# Patient Record
Sex: FEMALE | Race: BLACK OR AFRICAN AMERICAN | NOT HISPANIC OR LATINO | URBAN - METROPOLITAN AREA
[De-identification: names, ages, dates, MRNs, and addresses within clinical notes are randomized per-mention and may not be internally consistent; named-entity substitution may affect disease eponyms.]

---

## 2017-03-11 ENCOUNTER — EMERGENCY (EMERGENCY)
Facility: HOSPITAL | Age: 37
LOS: 1 days | Discharge: HOME | End: 2017-03-11
Attending: EMERGENCY MEDICINE
Payer: MEDICAID

## 2017-03-11 VITALS
SYSTOLIC BLOOD PRESSURE: 158 MMHG | HEART RATE: 93 BPM | HEIGHT: 67 IN | WEIGHT: 145.06 LBS | RESPIRATION RATE: 18 BRPM | OXYGEN SATURATION: 100 % | DIASTOLIC BLOOD PRESSURE: 105 MMHG | TEMPERATURE: 98 F

## 2017-03-11 VITALS
HEART RATE: 64 BPM | TEMPERATURE: 98 F | SYSTOLIC BLOOD PRESSURE: 166 MMHG | OXYGEN SATURATION: 98 % | RESPIRATION RATE: 16 BRPM | DIASTOLIC BLOOD PRESSURE: 84 MMHG

## 2017-03-11 LAB
ALBUMIN SERPL ELPH-MCNC: 4.1 G/DL — SIGNIFICANT CHANGE UP (ref 3.5–5)
ALP SERPL-CCNC: 46 U/L — SIGNIFICANT CHANGE UP (ref 40–120)
ALT FLD-CCNC: 22 U/L DA — SIGNIFICANT CHANGE UP (ref 10–60)
ANION GAP SERPL CALC-SCNC: 6 MMOL/L — SIGNIFICANT CHANGE UP (ref 5–17)
APPEARANCE UR: ABNORMAL
AST SERPL-CCNC: 37 U/L — SIGNIFICANT CHANGE UP (ref 10–40)
BACTERIA # UR AUTO: ABNORMAL /HPF
BASOPHILS # BLD AUTO: 0.1 K/UL — SIGNIFICANT CHANGE UP (ref 0–0.2)
BASOPHILS NFR BLD AUTO: 1.3 % — SIGNIFICANT CHANGE UP (ref 0–2)
BILIRUB SERPL-MCNC: 0.3 MG/DL — SIGNIFICANT CHANGE UP (ref 0.2–1.2)
BILIRUB UR-MCNC: NEGATIVE — SIGNIFICANT CHANGE UP
BUN SERPL-MCNC: 18 MG/DL — SIGNIFICANT CHANGE UP (ref 7–18)
CALCIUM SERPL-MCNC: 7.9 MG/DL — LOW (ref 8.4–10.5)
CHLORIDE SERPL-SCNC: 110 MMOL/L — HIGH (ref 96–108)
CO2 SERPL-SCNC: 25 MMOL/L — SIGNIFICANT CHANGE UP (ref 22–31)
COLOR SPEC: YELLOW — SIGNIFICANT CHANGE UP
COMMENT - URINE: SIGNIFICANT CHANGE UP
CREAT SERPL-MCNC: 0.95 MG/DL — SIGNIFICANT CHANGE UP (ref 0.5–1.3)
DIFF PNL FLD: ABNORMAL
EOSINOPHIL # BLD AUTO: 0.2 K/UL — SIGNIFICANT CHANGE UP (ref 0–0.5)
EOSINOPHIL NFR BLD AUTO: 3.3 % — SIGNIFICANT CHANGE UP (ref 0–6)
EPI CELLS # UR: ABNORMAL (ref 0–10)
GLUCOSE SERPL-MCNC: 79 MG/DL — SIGNIFICANT CHANGE UP (ref 70–99)
GLUCOSE UR QL: NEGATIVE — SIGNIFICANT CHANGE UP
HCG SERPL-ACNC: <1 MIU/ML — SIGNIFICANT CHANGE UP
HCG UR QL: NEGATIVE — SIGNIFICANT CHANGE UP
HCT VFR BLD CALC: 40.8 % — SIGNIFICANT CHANGE UP (ref 34.5–45)
HGB BLD-MCNC: 12.4 G/DL — SIGNIFICANT CHANGE UP (ref 11.5–15.5)
KETONES UR-MCNC: ABNORMAL
LEUKOCYTE ESTERASE UR-ACNC: ABNORMAL
LIDOCAIN IGE QN: 60 U/L — LOW (ref 73–393)
LYMPHOCYTES # BLD AUTO: 1.8 K/UL — SIGNIFICANT CHANGE UP (ref 1–3.3)
LYMPHOCYTES # BLD AUTO: 38.8 % — SIGNIFICANT CHANGE UP (ref 13–44)
MAGNESIUM SERPL-MCNC: 2.1 MG/DL — SIGNIFICANT CHANGE UP (ref 1.8–2.4)
MCHC RBC-ENTMCNC: 27.7 PG — SIGNIFICANT CHANGE UP (ref 27–34)
MCHC RBC-ENTMCNC: 30.3 GM/DL — LOW (ref 32–36)
MCV RBC AUTO: 91.3 FL — SIGNIFICANT CHANGE UP (ref 80–100)
MONOCYTES # BLD AUTO: 0.3 K/UL — SIGNIFICANT CHANGE UP (ref 0–0.9)
MONOCYTES NFR BLD AUTO: 6.7 % — SIGNIFICANT CHANGE UP (ref 2–14)
NEUTROPHILS # BLD AUTO: 2.4 K/UL — SIGNIFICANT CHANGE UP (ref 1.8–7.4)
NEUTROPHILS NFR BLD AUTO: 50 % — SIGNIFICANT CHANGE UP (ref 43–77)
NITRITE UR-MCNC: NEGATIVE — SIGNIFICANT CHANGE UP
PH UR: 5 — SIGNIFICANT CHANGE UP (ref 4.8–8)
PLATELET # BLD AUTO: 208 K/UL — SIGNIFICANT CHANGE UP (ref 150–400)
POTASSIUM SERPL-MCNC: 4.5 MMOL/L — SIGNIFICANT CHANGE UP (ref 3.5–5.3)
POTASSIUM SERPL-SCNC: 4.5 MMOL/L — SIGNIFICANT CHANGE UP (ref 3.5–5.3)
PROT SERPL-MCNC: 7.6 G/DL — SIGNIFICANT CHANGE UP (ref 6–8.3)
PROT UR-MCNC: 30 MG/DL
RBC # BLD: 4.47 M/UL — SIGNIFICANT CHANGE UP (ref 3.8–5.2)
RBC # FLD: 14.2 % — SIGNIFICANT CHANGE UP (ref 10.3–14.5)
RBC CASTS # UR COMP ASSIST: SIGNIFICANT CHANGE UP /HPF (ref 0–2)
SODIUM SERPL-SCNC: 141 MMOL/L — SIGNIFICANT CHANGE UP (ref 135–145)
SP GR SPEC: 1.02 — SIGNIFICANT CHANGE UP (ref 1.01–1.02)
UROBILINOGEN FLD QL: 1
WBC # BLD: 4.8 K/UL — SIGNIFICANT CHANGE UP (ref 3.8–10.5)
WBC # FLD AUTO: 4.8 K/UL — SIGNIFICANT CHANGE UP (ref 3.8–10.5)
WBC UR QL: SIGNIFICANT CHANGE UP /HPF (ref 0–5)

## 2017-03-11 PROCEDURE — 80053 COMPREHEN METABOLIC PANEL: CPT

## 2017-03-11 PROCEDURE — 85027 COMPLETE CBC AUTOMATED: CPT

## 2017-03-11 PROCEDURE — 81001 URINALYSIS AUTO W/SCOPE: CPT

## 2017-03-11 PROCEDURE — 84702 CHORIONIC GONADOTROPIN TEST: CPT

## 2017-03-11 PROCEDURE — 83690 ASSAY OF LIPASE: CPT

## 2017-03-11 PROCEDURE — 99284 EMERGENCY DEPT VISIT MOD MDM: CPT | Mod: 25

## 2017-03-11 PROCEDURE — 87086 URINE CULTURE/COLONY COUNT: CPT

## 2017-03-11 PROCEDURE — 74177 CT ABD & PELVIS W/CONTRAST: CPT | Mod: 26

## 2017-03-11 PROCEDURE — 96374 THER/PROPH/DIAG INJ IV PUSH: CPT | Mod: XU

## 2017-03-11 PROCEDURE — 83735 ASSAY OF MAGNESIUM: CPT

## 2017-03-11 PROCEDURE — 81025 URINE PREGNANCY TEST: CPT

## 2017-03-11 PROCEDURE — 74177 CT ABD & PELVIS W/CONTRAST: CPT

## 2017-03-11 PROCEDURE — 99285 EMERGENCY DEPT VISIT HI MDM: CPT

## 2017-03-11 RX ORDER — MORPHINE SULFATE 50 MG/1
4 CAPSULE, EXTENDED RELEASE ORAL ONCE
Qty: 0 | Refills: 0 | Status: DISCONTINUED | OUTPATIENT
Start: 2017-03-11 | End: 2017-03-11

## 2017-03-11 RX ORDER — SODIUM CHLORIDE 9 MG/ML
1000 INJECTION INTRAMUSCULAR; INTRAVENOUS; SUBCUTANEOUS
Qty: 0 | Refills: 0 | Status: DISCONTINUED | OUTPATIENT
Start: 2017-03-11 | End: 2017-03-15

## 2017-03-11 RX ADMIN — SODIUM CHLORIDE 200 MILLILITER(S): 9 INJECTION INTRAMUSCULAR; INTRAVENOUS; SUBCUTANEOUS at 11:54

## 2017-03-11 RX ADMIN — MORPHINE SULFATE 4 MILLIGRAM(S): 50 CAPSULE, EXTENDED RELEASE ORAL at 13:43

## 2017-03-11 RX ADMIN — MORPHINE SULFATE 4 MILLIGRAM(S): 50 CAPSULE, EXTENDED RELEASE ORAL at 14:15

## 2017-03-11 NOTE — ED PROVIDER NOTE - MEDICAL DECISION MAKING DETAILS
38 y/o F pt with intermittent generalized abd pain for 2 months. PE is unremarkable. Will get labs, CT Abd, and reassess. Likely d/c home.

## 2017-03-11 NOTE — ED PROVIDER NOTE - OBJECTIVE STATEMENT
38 y/o F pt with PMHx of HTN and PSHx of  and Ovarian Cystectomy presents to ED c/o intermittent generalized abd pain x2 months. Pt states abd pain is pressure-like. Pt also states nausea. Pt denies vomiting, fever, chills, dysuria, vaginal discharge, or any other complaints. Pt also denies weight loss (pt's appetite is normal). LMP: 3 days ago (2017). Allergies: Robaxin (hives, rash), NSAIDs (swelling, rash), Amoxicillin (unknown), Ibuprofen (unknown).

## 2017-03-11 NOTE — ED PROVIDER NOTE - NS ED MD SCRIBE ATTENDING SCRIBE SECTIONS
PAST MEDICAL/SURGICAL/SOCIAL HISTORY/HIV/REVIEW OF SYSTEMS/VITAL SIGNS( Pullset)/PHYSICAL EXAM/DISPOSITION/RESULTS/HISTORY OF PRESENT ILLNESS

## 2017-03-12 LAB
CULTURE RESULTS: SIGNIFICANT CHANGE UP
SPECIMEN SOURCE: SIGNIFICANT CHANGE UP

## 2017-03-15 DIAGNOSIS — Z88.1 ALLERGY STATUS TO OTHER ANTIBIOTIC AGENTS STATUS: ICD-10-CM

## 2017-03-15 DIAGNOSIS — Z88.6 ALLERGY STATUS TO ANALGESIC AGENT: ICD-10-CM

## 2017-03-15 DIAGNOSIS — Z87.891 PERSONAL HISTORY OF NICOTINE DEPENDENCE: ICD-10-CM

## 2017-03-15 DIAGNOSIS — R10.84 GENERALIZED ABDOMINAL PAIN: ICD-10-CM

## 2017-03-15 DIAGNOSIS — R11.0 NAUSEA: ICD-10-CM

## 2017-03-15 DIAGNOSIS — I10 ESSENTIAL (PRIMARY) HYPERTENSION: ICD-10-CM

## 2017-05-08 ENCOUNTER — EMERGENCY (EMERGENCY)
Facility: HOSPITAL | Age: 37
LOS: 1 days | Discharge: HOME | End: 2017-05-08
Attending: EMERGENCY MEDICINE
Payer: MEDICAID

## 2017-05-08 VITALS
WEIGHT: 139.99 LBS | DIASTOLIC BLOOD PRESSURE: 100 MMHG | TEMPERATURE: 98 F | HEIGHT: 67 IN | OXYGEN SATURATION: 100 % | HEART RATE: 80 BPM | RESPIRATION RATE: 18 BRPM | SYSTOLIC BLOOD PRESSURE: 155 MMHG

## 2017-05-08 DIAGNOSIS — I10 ESSENTIAL (PRIMARY) HYPERTENSION: ICD-10-CM

## 2017-05-08 DIAGNOSIS — R10.84 GENERALIZED ABDOMINAL PAIN: ICD-10-CM

## 2017-05-08 DIAGNOSIS — Z88.0 ALLERGY STATUS TO PENICILLIN: ICD-10-CM

## 2017-05-08 DIAGNOSIS — Z88.6 ALLERGY STATUS TO ANALGESIC AGENT: ICD-10-CM

## 2017-05-08 DIAGNOSIS — Z88.8 ALLERGY STATUS TO OTHER DRUGS, MEDICAMENTS AND BIOLOGICAL SUBSTANCES STATUS: ICD-10-CM

## 2017-05-08 DIAGNOSIS — Z98.890 OTHER SPECIFIED POSTPROCEDURAL STATES: ICD-10-CM

## 2017-05-08 LAB
ALBUMIN SERPL ELPH-MCNC: 3.9 G/DL — SIGNIFICANT CHANGE UP (ref 3.5–5)
ALP SERPL-CCNC: 43 U/L — SIGNIFICANT CHANGE UP (ref 40–120)
ALT FLD-CCNC: 21 U/L DA — SIGNIFICANT CHANGE UP (ref 10–60)
ANION GAP SERPL CALC-SCNC: 5 MMOL/L — SIGNIFICANT CHANGE UP (ref 5–17)
APPEARANCE UR: ABNORMAL
AST SERPL-CCNC: 22 U/L — SIGNIFICANT CHANGE UP (ref 10–40)
BILIRUB SERPL-MCNC: 0.2 MG/DL — SIGNIFICANT CHANGE UP (ref 0.2–1.2)
BILIRUB UR-MCNC: NEGATIVE — SIGNIFICANT CHANGE UP
BUN SERPL-MCNC: 20 MG/DL — HIGH (ref 7–18)
CALCIUM SERPL-MCNC: 8.7 MG/DL — SIGNIFICANT CHANGE UP (ref 8.4–10.5)
CHLORIDE SERPL-SCNC: 109 MMOL/L — HIGH (ref 96–108)
CO2 SERPL-SCNC: 27 MMOL/L — SIGNIFICANT CHANGE UP (ref 22–31)
COLOR SPEC: YELLOW — SIGNIFICANT CHANGE UP
CREAT SERPL-MCNC: 1.01 MG/DL — SIGNIFICANT CHANGE UP (ref 0.5–1.3)
DIFF PNL FLD: NEGATIVE — SIGNIFICANT CHANGE UP
GLUCOSE SERPL-MCNC: 82 MG/DL — SIGNIFICANT CHANGE UP (ref 70–99)
GLUCOSE UR QL: NEGATIVE — SIGNIFICANT CHANGE UP
HCG SERPL-ACNC: <1 MIU/ML — SIGNIFICANT CHANGE UP
HCT VFR BLD CALC: 39.3 % — SIGNIFICANT CHANGE UP (ref 34.5–45)
HGB BLD-MCNC: 12.1 G/DL — SIGNIFICANT CHANGE UP (ref 11.5–15.5)
KETONES UR-MCNC: NEGATIVE — SIGNIFICANT CHANGE UP
LEUKOCYTE ESTERASE UR-ACNC: ABNORMAL
MCHC RBC-ENTMCNC: 28.2 PG — SIGNIFICANT CHANGE UP (ref 27–34)
MCHC RBC-ENTMCNC: 30.9 GM/DL — LOW (ref 32–36)
MCV RBC AUTO: 91.4 FL — SIGNIFICANT CHANGE UP (ref 80–100)
NITRITE UR-MCNC: NEGATIVE — SIGNIFICANT CHANGE UP
PH UR: 7 — SIGNIFICANT CHANGE UP (ref 5–8)
PLATELET # BLD AUTO: 202 K/UL — SIGNIFICANT CHANGE UP (ref 150–400)
POTASSIUM SERPL-MCNC: 4.2 MMOL/L — SIGNIFICANT CHANGE UP (ref 3.5–5.3)
POTASSIUM SERPL-SCNC: 4.2 MMOL/L — SIGNIFICANT CHANGE UP (ref 3.5–5.3)
PROT SERPL-MCNC: 7.3 G/DL — SIGNIFICANT CHANGE UP (ref 6–8.3)
PROT UR-MCNC: 15
RBC # BLD: 4.3 M/UL — SIGNIFICANT CHANGE UP (ref 3.8–5.2)
RBC # FLD: 12.8 % — SIGNIFICANT CHANGE UP (ref 10.3–14.5)
SODIUM SERPL-SCNC: 141 MMOL/L — SIGNIFICANT CHANGE UP (ref 135–145)
SP GR SPEC: 1.01 — SIGNIFICANT CHANGE UP (ref 1.01–1.02)
UROBILINOGEN FLD QL: 4
WBC # BLD: 4.3 K/UL — SIGNIFICANT CHANGE UP (ref 3.8–10.5)
WBC # FLD AUTO: 4.3 K/UL — SIGNIFICANT CHANGE UP (ref 3.8–10.5)

## 2017-05-08 PROCEDURE — 81001 URINALYSIS AUTO W/SCOPE: CPT

## 2017-05-08 PROCEDURE — 99284 EMERGENCY DEPT VISIT MOD MDM: CPT

## 2017-05-08 PROCEDURE — 85027 COMPLETE CBC AUTOMATED: CPT

## 2017-05-08 PROCEDURE — 76817 TRANSVAGINAL US OBSTETRIC: CPT

## 2017-05-08 PROCEDURE — 76856 US EXAM PELVIC COMPLETE: CPT

## 2017-05-08 PROCEDURE — 36000 PLACE NEEDLE IN VEIN: CPT

## 2017-05-08 PROCEDURE — 76830 TRANSVAGINAL US NON-OB: CPT | Mod: 26

## 2017-05-08 PROCEDURE — 80053 COMPREHEN METABOLIC PANEL: CPT

## 2017-05-08 PROCEDURE — 76817 TRANSVAGINAL US OBSTETRIC: CPT | Mod: 26

## 2017-05-08 PROCEDURE — 84702 CHORIONIC GONADOTROPIN TEST: CPT

## 2017-05-08 PROCEDURE — 76856 US EXAM PELVIC COMPLETE: CPT | Mod: 26

## 2017-05-08 RX ADMIN — Medication 5 MILLILITER(S): at 13:30

## 2017-05-08 NOTE — ED PROVIDER NOTE - MEDICAL DECISION MAKING DETAILS
abd cramping, several ed visits, pt likely has psychological pregnancy, normal exam, labs reassuring. discussed anticipatory guidance. outpt follow up with ob/gyn and pmd.

## 2017-05-08 NOTE — ED ADULT NURSE NOTE - OBJECTIVE STATEMENT
Patient complains of abdominal pain. + nausea. Denies fever, chills. No guarding or facial grimacing noted. Abdomen is soft, non tender, non distended. Breathing easy and unlabored, speaking in full sentences.

## 2017-05-08 NOTE — ED PROVIDER NOTE - OBJECTIVE STATEMENT
38 y/o female with PMHx of HTN presents to the ED c/o generalized abd pain for the past few months. Pt reports that she has been feeling like she is having a "psychological pregnancy", but always has negative pregnancy tests. Pt notes that the only Sx she feels is abd pain which has been constant since Oct 2016. Pt denies fever, chill, CP, SOB, n/v/d, or any other complaints. NKDA. 36 y/o female with PMHx of HTN presents to the ED c/o generalized abd pain for the past few months, she reports as uterine contractions as when she was pregnant with her 2 children. Pt reports being pregnant x 7 months even though her blood test and urine test show negative pregnancy, neg hcg. Pt notes that she feels these sx since Oct 2016. Pt denies fever, chill, CP, SOB, n/v/d, or any other complaints. Denies vaginal dc, burning, itching, dc. NKDA.

## 2017-05-08 NOTE — ED PROVIDER NOTE - CONDUCTED A DETAILED DISCUSSION WITH PATIENT AND/OR GUARDIAN REGARDING, MDM
return to ED if symptoms worsen, persist or questions arise radiology results/return to ED if symptoms worsen, persist or questions arise/need for outpatient follow-up/lab results

## 2017-06-08 NOTE — ED ADULT TRIAGE NOTE - AS HEIGHT TYPE
DISCHARGE PLANNING    • Discharge to home or other facility with appropriate resources Progressing        PAIN - ADULT    • Verbalizes/displays adequate comfort level or patient's stated pain goal Progressing        Patient Centered Care    • Patient prefe stated

## 2017-06-25 ENCOUNTER — EMERGENCY (EMERGENCY)
Facility: HOSPITAL | Age: 37
LOS: 1 days | Discharge: HOME | End: 2017-06-25
Attending: EMERGENCY MEDICINE | Admitting: EMERGENCY MEDICINE
Payer: MEDICAID

## 2017-06-25 VITALS
RESPIRATION RATE: 16 BRPM | SYSTOLIC BLOOD PRESSURE: 170 MMHG | TEMPERATURE: 98 F | OXYGEN SATURATION: 100 % | HEART RATE: 68 BPM | DIASTOLIC BLOOD PRESSURE: 114 MMHG

## 2017-06-25 PROCEDURE — 99282 EMERGENCY DEPT VISIT SF MDM: CPT

## 2017-06-25 NOTE — ED PROVIDER NOTE - MEDICAL DECISION MAKING DETAILS
38 y/o F who states that she may be pregnant, she was explained that we needed to do blood test or a urine test to find out, she refused, she was oriented about results on her recent US and MRI studies but she didn't agreed with the results. I oriented the patient to follow up with her primary care physician and her Gynecologist for any other symptoms.

## 2017-06-25 NOTE — ED ADULT TRIAGE NOTE - CHIEF COMPLAINT QUOTE
Pt with PMH of HTN, c/o ongoing abd pain for several months. Pt states "I might have an ectopic pregnancy", had MRI done with negative results, states she had negative pregnancy tests, LMP 6/21. Pt denies other symptoms. Noted hypertensive in triage, states she hasn't been taking her blood pressure medications due to dealing with her abd issues.

## 2017-06-25 NOTE — ED PROVIDER NOTE - OBJECTIVE STATEMENT
Case of a 38 y/o female patient with history of HTN presenting for a concern that she is pregnant. Patient has been seen in the ED and her ObGyn multiple times for the same reason and extensive work up has been done all within normal limits. Patient states that she has "symptoms of pregnancy" and feels like a "baby is moving inside her" and that she needs a CT. Patient denies any nausea, vomits, diarrhea or any other symptoms.

## 2017-12-30 ENCOUNTER — EMERGENCY (EMERGENCY)
Facility: HOSPITAL | Age: 37
LOS: 1 days | Discharge: HOME | End: 2017-12-30
Attending: EMERGENCY MEDICINE
Payer: MEDICAID

## 2017-12-30 VITALS
SYSTOLIC BLOOD PRESSURE: 151 MMHG | HEART RATE: 77 BPM | DIASTOLIC BLOOD PRESSURE: 88 MMHG | WEIGHT: 149.91 LBS | RESPIRATION RATE: 18 BRPM | TEMPERATURE: 99 F | OXYGEN SATURATION: 100 % | HEIGHT: 67 IN

## 2017-12-30 PROCEDURE — 99283 EMERGENCY DEPT VISIT LOW MDM: CPT

## 2017-12-30 RX ORDER — FLUCONAZOLE 150 MG/1
1 TABLET ORAL
Qty: 1 | Refills: 0 | OUTPATIENT
Start: 2017-12-30

## 2017-12-30 RX ORDER — ACETAMINOPHEN 500 MG
650 TABLET ORAL ONCE
Qty: 0 | Refills: 0 | Status: COMPLETED | OUTPATIENT
Start: 2017-12-30 | End: 2017-12-30

## 2017-12-30 RX ORDER — POLYMYXIN B SULF/TRIMETHOPRIM 10000-1/ML
1 DROPS OPHTHALMIC (EYE)
Qty: 1 | Refills: 0 | OUTPATIENT
Start: 2017-12-30

## 2017-12-30 RX ORDER — ACETAMINOPHEN 500 MG
2 TABLET ORAL
Qty: 30 | Refills: 0 | OUTPATIENT
Start: 2017-12-30

## 2017-12-30 RX ADMIN — Medication 650 MILLIGRAM(S): at 19:35

## 2017-12-30 NOTE — ED PROVIDER NOTE - MEDICAL DECISION MAKING DETAILS
38 y/o female presents to the ED c/o neck abscess and facial swelling x 4 days. Cutaneous abscess and sty. Given abscess is superficial and already draining, will treat with abx and warm compresses. Will treat sty with abx. Instructed to f/u with ENT in 2-3 days. Instructed pt to treat sty with warm compresses. To f/u with PMD in 1 week. Return to ED if Sx worsening or unable to f/u. 36 y/o female presents to the ED c/o neck abscess and facial swelling x 4 days. Cutaneous abscess vs necrotic lymph node. Will treat abscess with abx and warm compresses. Will treat sty with topical abx and warm compresses. Instructed to f/u with ENT in 2-3 days. Instructed pt to treat sty with warm compresses. To f/u with PMD in 1 week. Return to ED if Sx worsening or unable to f/u.

## 2017-12-30 NOTE — ED PROVIDER NOTE - OBJECTIVE STATEMENT
36 y/o female with PMHx of HTN presents to the ED c/o neck abscess and R facial swelling x 4 days. Pt notes she has sensitive skin and has had similar Sx in the past. Pt notes she placed heat compresses and peroxide on the abscess to no relief. Pt also notes the swelling is around the R eyebrow. Pt denies fever, chills, or any other complaints. Pt allergic to various drugs (rash).

## 2017-12-30 NOTE — ED PROVIDER NOTE - CARE PLAN
Principal Discharge DX:	Lymphadenitis, acute  Secondary Diagnosis:	Hordeolum externum of right upper eyelid

## 2017-12-30 NOTE — ED PROVIDER NOTE - ENMT, MLM
Airway patent, Nasal mucosa clear. Mouth with normal mucosa. Throat has no vesicles, no oropharyngeal exudates and uvula is midline. Airway patent, Nasal mucosa clear. Mouth with normal mucosa. Throat has no vesicles, no oropharyngeal exudates and uvula is midline. airway patent. neck supple. no tonsillar enlargement.

## 2017-12-30 NOTE — ED ADULT NURSE NOTE - OBJECTIVE STATEMENT
Pt AOx3, ambulatory, c/o abscess to anterior left side of neck. p/w redness, swelling. Pt denies bleeding, or d/c. No fever.

## 2018-01-03 NOTE — ED POST DISCHARGE NOTE - ADDITIONAL DOCUMENTATION
Called by pharmacist 1/2/18. Doxycycline hyclate and monohyclate not covered by patient's insurance. Switched to bactrim DS

## 2018-05-14 ENCOUNTER — RESULT REVIEW (OUTPATIENT)
Age: 38
End: 2018-05-14

## 2018-11-19 ENCOUNTER — EMERGENCY (EMERGENCY)
Facility: HOSPITAL | Age: 38
LOS: 1 days | Discharge: HOME | End: 2018-11-19
Attending: EMERGENCY MEDICINE
Payer: MEDICAID

## 2018-11-19 VITALS
WEIGHT: 160.06 LBS | RESPIRATION RATE: 18 BRPM | SYSTOLIC BLOOD PRESSURE: 162 MMHG | TEMPERATURE: 98 F | DIASTOLIC BLOOD PRESSURE: 98 MMHG | HEIGHT: 67 IN | OXYGEN SATURATION: 100 % | HEART RATE: 81 BPM

## 2018-11-19 PROCEDURE — 99284 EMERGENCY DEPT VISIT MOD MDM: CPT | Mod: 25

## 2018-11-19 PROCEDURE — 99285 EMERGENCY DEPT VISIT HI MDM: CPT

## 2018-11-19 PROCEDURE — 87186 SC STD MICRODIL/AGAR DIL: CPT

## 2018-11-19 PROCEDURE — 10060 I&D ABSCESS SIMPLE/SINGLE: CPT | Mod: LT

## 2018-11-19 PROCEDURE — 76642 ULTRASOUND BREAST LIMITED: CPT

## 2018-11-19 PROCEDURE — 76642 ULTRASOUND BREAST LIMITED: CPT | Mod: 26,LT

## 2018-11-19 PROCEDURE — 87070 CULTURE OTHR SPECIMN AEROBIC: CPT

## 2018-11-19 RX ORDER — CODEINE SULFATE 60 MG/1
15 TABLET ORAL ONCE
Qty: 0 | Refills: 0 | Status: DISCONTINUED | OUTPATIENT
Start: 2018-11-19 | End: 2018-11-19

## 2018-11-19 RX ORDER — ACETAMINOPHEN WITH CODEINE 300MG-30MG
1 TABLET ORAL
Qty: 9 | Refills: 0 | OUTPATIENT
Start: 2018-11-19 | End: 2018-11-21

## 2018-11-19 RX ORDER — LIDOCAINE 4 G/100G
1 CREAM TOPICAL ONCE
Qty: 0 | Refills: 0 | Status: COMPLETED | OUTPATIENT
Start: 2018-11-19 | End: 2018-11-19

## 2018-11-19 RX ORDER — ACETAMINOPHEN 500 MG
975 TABLET ORAL ONCE
Qty: 0 | Refills: 0 | Status: COMPLETED | OUTPATIENT
Start: 2018-11-19 | End: 2018-11-19

## 2018-11-19 RX ORDER — ONDANSETRON 8 MG/1
4 TABLET, FILM COATED ORAL ONCE
Qty: 0 | Refills: 0 | Status: COMPLETED | OUTPATIENT
Start: 2018-11-19 | End: 2018-11-19

## 2018-11-19 RX ADMIN — Medication 975 MILLIGRAM(S): at 15:51

## 2018-11-19 RX ADMIN — ONDANSETRON 4 MILLIGRAM(S): 8 TABLET, FILM COATED ORAL at 15:40

## 2018-11-19 RX ADMIN — Medication 300 MILLIGRAM(S): at 15:41

## 2018-11-19 RX ADMIN — Medication 975 MILLIGRAM(S): at 14:21

## 2018-11-19 RX ADMIN — LIDOCAINE 1 APPLICATION(S): 4 CREAM TOPICAL at 13:59

## 2018-11-19 NOTE — CONSULT NOTE ADULT - ASSESSMENT
Left breast abscess. Bedside aspiration performed with return of seropurulent material. Culture taken  1. F/u with Dr. Graham on Wednesday November 28th in the surgical office  2. Discharge patient home with Clindamycin 300mg and zofran for nausea  3. Will f/u culture in the outpatient surgical office  4. Case and plan discussed with Dr. Graham and agreed

## 2018-11-19 NOTE — ED PROVIDER NOTE - PROGRESS NOTE DETAILS
NP NOTE: Pt seen by intake physician and HPI/ROS/PE/MDM reviewed. Pt seen and evaluated. Discussed plan and any resulted studies at this time. Will continue to monitor and re-evaluate.  Re-Evaluation: spoke with surgical PA who requested US, and will see patient.

## 2018-11-19 NOTE — ED PROVIDER NOTE - CONSTITUTIONAL NEGATIVE STATEMENT, MLM
Normal rate, regular rhythm.  Heart sounds S1, S2.  No murmurs, rubs or gallops.
no fever and no chills.

## 2018-11-19 NOTE — ED PROVIDER NOTE - OBJECTIVE STATEMENT
37 y/o F pt with a significant PMHx of HTN, frequent abscesses and a significant PSHx of , ovarian cystectomy presents to the ED c/o abscess to L breast x1.5 weeks. Pt reports she had an US of her b/l breasts in May. Pt states she has been applying Clindamycin ointment to her abscess. Pt denies fever, chills or any other complaints. Allergies: Robaxin, NSAIDs, Amoxicillin, Ibuprofen.

## 2018-11-19 NOTE — ED PROVIDER NOTE - SKIN BREAST LEFT
2x2 circular area of crusting and induration lateral to L nipple. Mildly firm, tender with slight d/c.

## 2018-11-19 NOTE — CONSULT NOTE ADULT - SUBJECTIVE AND OBJECTIVE BOX
General Surgery Consultation Note    Patient is a 38y old  Female who presents with a chief complaint of Left breast pain    HPI:  37 yo F PMH HTN, bilateral axillary hidradenitis, back abscess s/p aspiration, c/o 1.5 weeks h/o pain and swelling L breast. Pt states the breast became progressively more painful and swollen. She also states she noted clear liquid from the breast.  PT denies any history of trauma or piercing to the left breast.  Denies fever, chills. Pt was not given oral antibiotics. PT last ate 2 to 3 hours ago.     PAST MEDICAL & SURGICAL HISTORY:  Hypertension  S/P ovarian cystectomy  S/P  section      Allergies    amoxicillin (Unknown)  ibuprofen (Unknown)  NSAIDs (Swelling; Rash)  Robaxin (Hives; Rash)        MEDICATIONS  (STANDING):  clindamycin   Capsule 300 milliGRAM(s) Oral Once  codeine 15 milliGRAM(s) Oral Once  ondansetron   Disintegrating Tablet 4 milliGRAM(s) Oral Once    Vital Signs Last 24 Hrs  T(C): 36.8 (2018 11:42), Max: 36.8 (2018 11:42)  T(F): 98.3 (2018 11:42), Max: 98.3 (2018 11:42)  HR: 81 (2018 11:42) (81 - 81)  BP: 162/98 (2018 11:42) (162/98 - 162/98)  BP(mean): --  RR: 18 (2018 11:42) (18 - 18)  SpO2: 100% (2018 11:42) (100% - 100%)    Physical Exam:  Gen: awake, alert oriented NAD  Breast: L breast with erythema and raised nodularity at 2o'clock, + central area with fluctuance, ++ tenderness. Right breast without masses or lesions, nl contour  Skin: multiple healed excoriations on skin    Labs: none    Radiological Exams:  < from: US Breast Limited, Left (18 @ 13:48) >  IMPRESSION:  2.8 x 1.5 x 3.0 cm complex fluid collection as described above which may   represent abscess in the correct clinical setting. Neoplastic process not   excluded. Correlate clinically and follow-up to resolution recommended      < end of copied text >

## 2018-11-19 NOTE — ED PROVIDER NOTE - CPE EDP EYES NORM
History     Chief Complaint:  Cough    HPI Phone  offered however, patient preferred family/friend.   Tea Miles is a 31 year old female who presents to the emergency department today via EMS for evaluation of cough. For the last few days she has had a slight, nonproductive cough. Today she started having some shortness of breath with the cough. EMS gave one Duoneb in route and expiratory wheezing cleared up with this per EMS. After receiving this, the patient feels improvement in her symptoms. Her dyspnea has worsened with exertion but is not associated with chest pain, fever or peripheral edema.    Allergies:  No Known Drug Allergies      Medications:    Zofran   Advil  Omeprazole  Albuterol  Flovent    Past Medical History:    Prediabetes    Past Surgical History:    History reviewed. No pertinent past surgical history.     Family History:    History reviewed. No pertinent family history.      Social History:  The patient was accompanied to the ED by .  Smoking Status: never smoker  Alcohol Use: negative  Marital Status:   [2]    Review of Systems   Respiratory: Positive for cough, shortness of breath and wheezing.    All other systems reviewed and are negative.    Physical Exam   Vitals:  Patient Vitals for the past 24 hrs:   BP Temp Temp src Pulse Heart Rate Resp SpO2   02/18/17 0810 - - - - - - 100 %   02/18/17 0755 135/74 98.7  F (37.1  C) Oral 86 86 18 98 %         Physical Exam    General:   Pleasant, age appropriate.      Resting comfortably in the bed.  HEENT:    Oropharynx is moist, without lesions or trismus.     TMs clear.  Eyes:    Conjunctiva normal, PERRL  Neck:    Supple, no meningismus.     CV:     Regular rate and rhythm.      No murmurs, rubs or gallops.       No JVD or unilateral leg swelling.       2+ radial pulses bilateral.       No lower extremity edema.  PULM:    Moderate expiratory wheezing right > left      No respiratory distress.      Good air  exchange.     Speaks in full sentences.     No rales or rhonci.     No stridor.  ABD:    Soft, non-tender, non-distended.       No rebound, guarding or rigidity.  MSK:     No gross deformity to all four extremities.   LYMPH:   No cervical lymphadenopathy.  NEURO:   Alert and oriented x 3.      Strength is equal and symmetric.  Skin:    Warm, dry and intact.    Psych:    Mood is good and affect is appropriate.          Emergency Department Course     Imaging:  Radiology findings were communicated with the patient who voiced understanding of the findings.    Chest XR,  PA & LAT   IMPRESSION: Negative exam.      ESTHER ARGUETA MD     Laboratory:  Laboratory findings were communicated with the patient who voiced understanding of the findings.    Influenza A/B antigen: negative      Interventions:  0802 Deltasone 60 mg oral   0810 Duoneb 6 ml nebulization      Emergency Department Course:  Nursing notes and vitals reviewed.  I performed an exam of the patient as documented above.   I discussed the treatment plan with the patient. They expressed understanding of this plan and consented to discharge. They will be discharged home with instructions for care and follow up. In addition, the patient will return to the emergency department if their symptoms persist, worsen, if new symptoms arise or if there is any concern.  All questions were answered.   I personally reviewed the laboratory and imaging results with the patient and answered all related questions prior to discharge.    Impression & Plan      Medical Decision Making:  Tea Miles is a 31 year old female who presents to the emergency department with difficulty breathing and cough. On examination the patient had diffuse expiratory wheezing, right > left. She initially reported she had no history of bronchospasms or asthma. Bronchodilators and steroids were given with remarkable improvement. She has mild residual wheezing but no respiratory distress or hypoxia.  Given her asymmetric findings and no reported history of prior bronchospasm, chest x-ray was done to ensure there was no evidence of right sided pneumonia. There is none. Influenza is negative. Findings are consistent with viral bronchitis and associated reactive airway disease. Patient will be discharged home on Prednisone and Albuterol. Close follow up with PCP to ensure improvement.     Diagnosis:    ICD-10-CM    1. Acute bronchitis, unspecified organism J20.9    2. Reactive airway disease, unspecified asthma severity, with acute exacerbation J45.901      Disposition:   Discharge    Discharge Medications:  New Prescriptions    ALBUTEROL (PROAIR HFA/PROVENTIL HFA/VENTOLIN HFA) 108 (90 BASE) MCG/ACT INHALER    Inhale 2 puffs into the lungs every 4 hours as needed for shortness of breath / dyspnea or wheezing    PREDNISONE (DELTASONE) 20 MG TABLET    Take two tablets (= 40mg) each day for 5 (five) days     Scribe Disclosure:  Vicente PALOMO, am serving as a scribe at 7:52 AM on 2/18/2017 to document services personally performed by Aaron Andrew MD, based on my observations and the provider's statements to me.   2/18/2017   [facility] EMERGENCY DEPARTMENT       Aaron Andrew MD  02/18/17 1006     normal...

## 2018-11-19 NOTE — ED ADULT NURSE NOTE - SKIN INTEGRITY
left breast abscess with swelling , tenderness. left breast abscess with 2cm x 2cm area of skin crusting ,swelling , tenderness.near nipple lateral side

## 2018-11-21 LAB
-  AMPICILLIN/SULBACTAM: SIGNIFICANT CHANGE UP
-  CEFAZOLIN: SIGNIFICANT CHANGE UP
-  CLINDAMYCIN: SIGNIFICANT CHANGE UP
-  ERYTHROMYCIN: SIGNIFICANT CHANGE UP
-  GENTAMICIN: SIGNIFICANT CHANGE UP
-  OXACILLIN: SIGNIFICANT CHANGE UP
-  PENICILLIN: SIGNIFICANT CHANGE UP
-  RIFAMPIN: SIGNIFICANT CHANGE UP
-  TETRACYCLINE: SIGNIFICANT CHANGE UP
-  TRIMETHOPRIM/SULFAMETHOXAZOLE: SIGNIFICANT CHANGE UP
-  VANCOMYCIN: SIGNIFICANT CHANGE UP
METHOD TYPE: SIGNIFICANT CHANGE UP

## 2018-11-24 LAB
CULTURE RESULTS: SIGNIFICANT CHANGE UP
ORGANISM # SPEC MICROSCOPIC CNT: SIGNIFICANT CHANGE UP
ORGANISM # SPEC MICROSCOPIC CNT: SIGNIFICANT CHANGE UP
SPECIMEN SOURCE: SIGNIFICANT CHANGE UP

## 2020-07-29 NOTE — ED PROVIDER NOTE - NS ED MD SCRIBE ATTENDING SCRIBE SECTIONS
Blood tests negative for Celiac disease.     PM
Idalmis, your thyroid levels are normal we will repeat them in 1 year. An order has been placed.
PAST MEDICAL/SURGICAL/SOCIAL HISTORY/REVIEW OF SYSTEMS/HIV/HISTORY OF PRESENT ILLNESS/VITAL SIGNS( Pullset)/DISPOSITION/PHYSICAL EXAM

## 2020-12-09 NOTE — ED PROVIDER NOTE - ATTENDING CONTRIBUTION TO CARE
Started on metoprolol 25 mg p.o. b.i.d. for Afib rate control.  Will continue to monitor.     left breast abscess.

## 2021-09-15 NOTE — ED ADULT TRIAGE NOTE - AS HEIGHT TYPE
stated Mixed acid base disorder  -AGMA likely 2/2 lactic acidosis with respiratory compensation  -most likely i/s/o sepsis although tumor production and decreased hepatic clearance is also a possibility  -trend lactate

## 2021-09-16 NOTE — ED PROVIDER NOTE - CONDITION AT DISCHARGE:
SUBJECTIVE:   Presents today for follow up. Chief Complaint   Patient presents with    Blood Pressure Check       Back pain okay. She still has bilateral low back pain and \"stiffness\" at times. She no longer seeing Dr. Romana Slough. She has had anal cancer, diagnosed . She underwent biopsy in December, and this showed poorly differentiated adenocarcinoma. She saw Dr. Magno Pyle, who oversaw chemotherapy and XRT--she has completed this. Her surgeon was Dr. Deisi Matthews. Now has some scar tissue, diarrhea at times. She denies any end organ symptoms (chest pain, focal weakness, etc). Gets pain and itching from hemorrhoids from time to time. Doing better. PMH: HTN. GERD. HLP. Anal Cancer ( s/p Chemo and XRT in ; Saw Dr. Magno Pyle and Dr. Deisi Matthews). Dr. Patricia Nam was her gynecologist.  PSH: Appe, Oopherectomy, rectal mass biopsy. All: ? ACEI -- cough     MED:    Current Outpatient Medications on File Prior to Visit   Medication Sig Dispense Refill    valsartan-hydroCHLOROthiazide (DIOVAN-HCT) 80-12.5 mg per tablet Take 1 tablet by mouth once daily 90 Tab 2    simvastatin (ZOCOR) 10 mg tablet TAKE 1 TABLET EVERY NIGHT 90 Tab 2    aspirin 81 mg Tab Take  by mouth.  ondansetron (ZOFRAN ODT) 4 mg disintegrating tablet Take 1 Tab by mouth every eight (8) hours as needed for Nausea or Vomiting. (Patient not taking: Reported on 2021) 30 Tab 1    methocarbamol (ROBAXIN) 750 mg tablet Take 1 Tab by mouth four (4) times daily. (Patient not taking: Reported on 2021) 60 Tab 1    traMADol (ULTRAM) 50 mg tablet Take 1 Tab by mouth every six (6) hours as needed for Pain. Max Daily Amount: 200 mg. (Patient not taking: Reported on 2021) 30 Tab 1     No current facility-administered medications on file prior to visit. SH: BRITTANI  --retired. Nonsmoker. FH: Mother  80 last year of CHF. Sister had massive stroke at 72. GM had stroke. HTN runs in family.      ROS: Otw unremarkable except as noted elsewhere. OBJECTIVE:    Visit Vitals  /76 (BP 1 Location: Right arm, BP Patient Position: Sitting)   Pulse 65   Temp 98.1 °F (36.7 °C) (Temporal)   Resp 16   Ht 5' 6\" (1.676 m)   Wt 181 lb 12.8 oz (82.5 kg)   SpO2 97%   BMI 29.34 kg/m²   . ASSESSMENT/PLAN:   1. Tinnitus: Refer ENT  2. Hypertension. Back on meds. BP not checked as this was a virtual visit. She should get BP checked periodically at pharmacy or elsewhere. 3. Hyperlipidemia: LDL okay at last check. Continue simvastatin  4. GERD: Stable  5. Back pain: Continue current meds. 6. Anal cancer: s/p chemo and XRT. Refer back to colorectal surgery, Dr. Lee Floyd. 7. Overweight: Discussed diet and exercise. 8. Hip pain: Doing fine. 9. \"Moles\"/skin lesion: No change. Referred prev to derm. RTC in about 4 months. Improved

## 2025-05-26 ENCOUNTER — EMERGENCY (EMERGENCY)
Facility: HOSPITAL | Age: 45
LOS: 1 days | End: 2025-05-26
Attending: EMERGENCY MEDICINE
Payer: MEDICAID

## 2025-05-26 VITALS
RESPIRATION RATE: 16 BRPM | HEART RATE: 94 BPM | WEIGHT: 141.98 LBS | TEMPERATURE: 98 F | SYSTOLIC BLOOD PRESSURE: 169 MMHG | OXYGEN SATURATION: 99 % | DIASTOLIC BLOOD PRESSURE: 103 MMHG | HEIGHT: 67 IN

## 2025-05-26 VITALS
DIASTOLIC BLOOD PRESSURE: 90 MMHG | TEMPERATURE: 98 F | OXYGEN SATURATION: 100 % | SYSTOLIC BLOOD PRESSURE: 158 MMHG | HEART RATE: 74 BPM | RESPIRATION RATE: 19 BRPM

## 2025-05-26 PROCEDURE — 99284 EMERGENCY DEPT VISIT MOD MDM: CPT

## 2025-05-26 PROCEDURE — 73502 X-RAY EXAM HIP UNI 2-3 VIEWS: CPT

## 2025-05-26 PROCEDURE — 73610 X-RAY EXAM OF ANKLE: CPT | Mod: 26,RT

## 2025-05-26 PROCEDURE — 99284 EMERGENCY DEPT VISIT MOD MDM: CPT | Mod: 25

## 2025-05-26 PROCEDURE — 73502 X-RAY EXAM HIP UNI 2-3 VIEWS: CPT | Mod: 26,LT

## 2025-05-26 PROCEDURE — 73610 X-RAY EXAM OF ANKLE: CPT

## 2025-05-26 RX ORDER — IBUPROFEN 200 MG
1 TABLET ORAL
Refills: 0
Start: 2025-05-26

## 2025-05-26 RX ORDER — CIPROFLOXACIN HCL 250 MG
1 TABLET ORAL
Qty: 14 | Refills: 0
Start: 2025-05-26 | End: 2025-06-01

## 2025-05-26 RX ORDER — ACETAMINOPHEN 500 MG/5ML
650 LIQUID (ML) ORAL ONCE
Refills: 0 | Status: COMPLETED | OUTPATIENT
Start: 2025-05-26 | End: 2025-05-26

## 2025-05-26 RX ORDER — OXYCODONE HYDROCHLORIDE AND ACETAMINOPHEN 10; 325 MG/1; MG/1
1 TABLET ORAL
Qty: 6 | Refills: 0
Start: 2025-05-26

## 2025-05-26 RX ORDER — METRONIDAZOLE 250 MG
1 TABLET ORAL
Qty: 21 | Refills: 0
Start: 2025-05-26 | End: 2025-06-01

## 2025-05-26 RX ADMIN — Medication 650 MILLIGRAM(S): at 07:44
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
## 2025-05-26 NOTE — ED PROVIDER NOTE - GASTROINTESTINAL NEGATIVE STATEMENT, MLM
Oculoplastic Surgeon Procedure Text (C): After obtaining clear surgical margins the patient was sent to oculoplastics for surgical repair.  The patient understands they will receive post-surgical care and follow-up from the referring physician's office. no abdominal pain, no bloating, no constipation, no diarrhea, no nausea and no vomiting.
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
## 2025-05-26 NOTE — ED PROVIDER NOTE - PATIENT PORTAL LINK FT
You can access the FollowMyHealth Patient Portal offered by [facility] by registering at the following website: [email or web address]. By joining [facility]’s FollowMyHealth portal, you will also be able to view your health information using other applications (apps) compatible with our system.

## 2025-05-26 NOTE — ED ADULT NURSE NOTE - NSFALLUNIVINTERV_ED_ALL_ED
Bed/Stretcher in lowest position, wheels locked, appropriate side rails in place/Call bell, personal items and telephone in reach/Instruct patient to call for assistance before getting out of bed/chair/stretcher/Non-slip footwear applied when patient is off stretcher/[place] to call system/Physically safe environment - no spills, clutter or unnecessary equipment/Purposeful proactive rounding/Room/bathroom lighting operational, light cord in reach
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
## 2025-05-26 NOTE — ED PROVIDER NOTE - NSFOLLOWUPINSTRUCTIONS_ED_ALL_ED_FT
Musculoskeletal Pain    WHAT YOU NEED TO KNOW:    What do I need to know about musculoskeletal pain? Musculoskeletal pain can occur in muscles, bones, joints, ligaments, tendons, or nerves. The pain can be dull, achy, or sharp. You may have pain and tenderness to the touch as well. The pain can occur anywhere in your body. Musculoskeletal pain can be from an injury, surgery, or a medical condition such as polymyositis.    How is the cause of musculoskeletal pain diagnosed? Your healthcare provider will ask about past medical conditions or injuries. Your provider may touch, press, bend, stretch, or move the painful area. Tell your provider when the pain started and if the pain is constant or comes and goes. Tell your provider if the pain is dull, sharp, or achy. Also tell your provider if the pain wakes you from sleep. You may also need any of the following tests:    X-ray, MRI, or CT scan pictures may show an injury or health condition that is causing your pain. Contrast liquid may be given to help the area show up better in the pictures. Tell the healthcare provider if you have ever had an allergic reaction to contrast liquid. Do not enter the MRI room with anything metal. Metal can cause serious injury. Tell the provider if you have any metal in or on your body.    Ultrasound pictures may show problems in your muscles or tissues that are causing your pain.  How is musculoskeletal pain treated?    NSAIDs help decrease swelling and pain or fever. This medicine is available with or without a doctor's order. NSAIDs can cause stomach bleeding or kidney problems in certain people. If you take blood thinner medicine, always ask your healthcare provider if NSAIDs are safe for you. Always read the medicine label and follow directions.    Acetaminophen decreases pain and fever. It is available without a doctor's order. Ask how much to take and how often to take it. Follow directions. Read the labels of all other medicines you are using to see if they also contain acetaminophen, or ask your doctor or pharmacist. Acetaminophen can cause liver damage if not taken correctly.    Muscle relaxers help relax your muscles to decrease pain and muscle spasms.    Steroids may be given to decrease redness, pain, and swelling.    A pain cream, gel, or patch may be applied to your skin on painful areas.  What can I do to manage my symptoms?    Rest as directed. Avoid activity that causes pain. You may be able to return to normal activity when you can move without pain. Follow directions for rest and activity.    Ice the painful area to decrease pain and swelling. Use an ice pack, or put ice in a plastic bag and cover it with a towel. Always put a cloth between the ice and your skin. Apply the ice as often as directed for the first 24 to 48 hours.    Apply heat to the area as directed. Heat helps decrease pain and muscle spasms. Your healthcare provider will tell you when and how often to apply heat.    Apply compression to the area, if directed. Your provider may want you to use a splint, brace, or elastic bandage. Compression helps decrease pain and swelling. A splint, brace, or bandage will also help protect the painful area when you move around.  How to Wrap an Elastic Bandage      Elevate (raise) the painful area to reduce swelling and pain. Use pillows, blankets, or rolled towels to elevate the area above the level of your heart. Elevate the area as often as you can.    Elevate Leg      Ask your provider if alternative therapies are right for you. Examples include acupuncture, relaxation, and massage. These therapies may help reduce pain.  When should I seek immediate care?    You have severe pain when you move the area.    You lose feeling in the area.    You have new or worse pain or swelling in the area. Your skin may feel tight.  When should I call my doctor?    You have a fever.    You have pain that does not get better with treatment.    You have trouble sleeping because of your pain.    Your painful area becomes more tender, red, and warm to the touch.    You have less movement of the painful area.    You have questions or concerns about your condition or care.  CARE AGREEMENT:    You have the right to help plan your care. Learn about your health condition and how it may be treated. Discuss treatment options with your healthcare providers to decide what care you want to receive. You always have the right to refuse treatment.    © [employer] 1973, 2025

## 2025-06-02 ENCOUNTER — EMERGENCY (EMERGENCY)
Facility: HOSPITAL | Age: 45
LOS: 1 days | End: 2025-06-02
Attending: EMERGENCY MEDICINE
Payer: MEDICAID

## 2025-06-02 VITALS
DIASTOLIC BLOOD PRESSURE: 80 MMHG | HEART RATE: 70 BPM | SYSTOLIC BLOOD PRESSURE: 138 MMHG | RESPIRATION RATE: 18 BRPM | OXYGEN SATURATION: 99 % | TEMPERATURE: 98 F

## 2025-06-02 VITALS
RESPIRATION RATE: 18 BRPM | TEMPERATURE: 98 F | OXYGEN SATURATION: 100 % | SYSTOLIC BLOOD PRESSURE: 145 MMHG | HEIGHT: 67 IN | WEIGHT: 154.98 LBS | HEART RATE: 73 BPM | DIASTOLIC BLOOD PRESSURE: 94 MMHG

## 2025-06-02 PROCEDURE — 73630 X-RAY EXAM OF FOOT: CPT | Mod: 26,RT

## 2025-06-02 PROCEDURE — 73630 X-RAY EXAM OF FOOT: CPT

## 2025-06-02 PROCEDURE — 99284 EMERGENCY DEPT VISIT MOD MDM: CPT

## 2025-06-02 PROCEDURE — 99283 EMERGENCY DEPT VISIT LOW MDM: CPT | Mod: 25

## 2025-06-02 RX ORDER — OXYCODONE HYDROCHLORIDE AND ACETAMINOPHEN 10; 325 MG/1; MG/1
1 TABLET ORAL
Qty: 8 | Refills: 0
Start: 2025-06-02 | End: 2025-06-05

## 2025-06-02 RX ORDER — TRAMADOL HYDROCHLORIDE 50 MG/1
50 TABLET, FILM COATED ORAL ONCE
Refills: 0 | Status: DISCONTINUED | OUTPATIENT
Start: 2025-06-02 | End: 2025-06-02

## 2025-06-02 NOTE — ED PROVIDER NOTE - NSFOLLOWUPINSTRUCTIONS_ED_ALL_ED_FT
Follow up with the   Podiatrist within 1 week.    If you experience any new or worsening symptoms or if you are concerned you can always come back to the emergency for a re-evaluation.  Some results may not be available at the time of your discharge from the hospital. You can download the FOLLOW MY HEALTH alex and have access to these results.  If there were any prescriptions given to you during the visit today take them as prescribed. If you have any questions you can ask the pharmacist.
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
## 2025-06-02 NOTE — ED PROVIDER NOTE - PHYSICAL EXAMINATION
Left hip full range of motion.  Localized ecchymosis/hematoma.  No erythema, induration or fluctuance.  Right ankle full range of motion without spinal tenderness.  Right foot without discoloration or edema.  Tenderness to the distal 3rd, 4th and 5th metatarsal area.  All toes full range of motion without tenderness.  DP/PT pulses intact plus bilaterally.  Cap refill less than 2 seconds.
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
## 2025-06-02 NOTE — ED PROVIDER NOTE - NSFOLLOWUPCLINICS_GEN_ALL_ED_FT
[facility]  Podiatry/Wound Care  [address] [place] [address]  Phone: [phone]  Fax: [phone]

## 2025-06-02 NOTE — ED ADULT NURSE NOTE - DISCHARGE DATE/TIME
Received call from mom regarding disability papers. Phone call transferred to  as previously working on papers.    02-Jun-2025 20:32
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
## 2025-06-02 NOTE — ED PROVIDER NOTE - CARE PLAN
1 Principal Discharge DX:	Hematoma of left thigh   Principal Discharge DX:	Hematoma of left thigh  Secondary Diagnosis:	Right foot sprain
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
## 2025-06-02 NOTE — ED PROVIDER NOTE - PATIENT PORTAL LINK FT
You can access the FollowMyHealth Patient Portal offered by [facility] by registering at the following website: [email or web address]. By joining [employer]’s FollowMyHealth portal, you will also be able to view your health information using other applications (apps) compatible with our system.

## 2025-06-02 NOTE — ED ADULT NURSE NOTE - NSFALLUNIVINTERV_ED_ALL_ED
Bed/Stretcher in lowest position, wheels locked, appropriate side rails in place/Call bell, personal items and telephone in reach/Instruct patient to call for assistance before getting out of bed/chair/stretcher/Non-slip footwear applied when patient is off stretcher/[place] to call system/Physically safe environment - no spills, clutter or unnecessary equipment/Purposeful proactive rounding/Room/bathroom lighting operational, light cord in reach
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
